# Patient Record
Sex: FEMALE | Race: WHITE
[De-identification: names, ages, dates, MRNs, and addresses within clinical notes are randomized per-mention and may not be internally consistent; named-entity substitution may affect disease eponyms.]

---

## 2019-11-27 ENCOUNTER — APPOINTMENT (OUTPATIENT)
Dept: PHYSICAL MEDICINE AND REHAB | Facility: CLINIC | Age: 54
End: 2019-11-27
Payer: COMMERCIAL

## 2019-11-27 VITALS
DIASTOLIC BLOOD PRESSURE: 67 MMHG | WEIGHT: 180 LBS | HEART RATE: 64 BPM | BODY MASS INDEX: 28.25 KG/M2 | HEIGHT: 67 IN | SYSTOLIC BLOOD PRESSURE: 101 MMHG

## 2019-11-27 DIAGNOSIS — Z78.9 OTHER SPECIFIED HEALTH STATUS: ICD-10-CM

## 2019-11-27 DIAGNOSIS — Z86.73 PERSONAL HISTORY OF TRANSIENT ISCHEMIC ATTACK (TIA), AND CEREBRAL INFARCTION W/OUT RESIDUAL DEFICITS: ICD-10-CM

## 2019-11-27 DIAGNOSIS — Z80.9 FAMILY HISTORY OF MALIGNANT NEOPLASM, UNSPECIFIED: ICD-10-CM

## 2019-11-27 PROBLEM — Z00.00 ENCOUNTER FOR PREVENTIVE HEALTH EXAMINATION: Status: ACTIVE | Noted: 2019-11-27

## 2019-11-27 PROCEDURE — 99203 OFFICE O/P NEW LOW 30 MIN: CPT

## 2019-11-27 RX ORDER — CIPROFLOXACIN HYDROCHLORIDE 250 MG/1
250 TABLET, FILM COATED ORAL
Qty: 6 | Refills: 0 | Status: DISCONTINUED | COMMUNITY
Start: 2019-09-15

## 2019-11-27 RX ORDER — NITROFURANTOIN (MONOHYDRATE/MACROCRYSTALS) 25; 75 MG/1; MG/1
100 CAPSULE ORAL
Qty: 14 | Refills: 0 | Status: DISCONTINUED | COMMUNITY
Start: 2019-10-15

## 2019-11-27 RX ORDER — PHENAZOPYRIDINE HYDROCHLORIDE 100 MG/1
100 TABLET ORAL
Qty: 6 | Refills: 0 | Status: DISCONTINUED | COMMUNITY
Start: 2019-09-15

## 2019-11-27 RX ORDER — CEFADROXIL 500 MG/1
500 CAPSULE ORAL
Qty: 14 | Refills: 0 | Status: DISCONTINUED | COMMUNITY
Start: 2019-07-17

## 2019-11-27 NOTE — DATA REVIEWED
[Plain X-Rays] : plain X-Rays [FreeTextEntry1] : X-rays R elbow reviewed in office: no fx; no significant joint space narrowing; small osteophyte off olecranon.

## 2019-11-27 NOTE — ASSESSMENT
[FreeTextEntry1] : 54 y.o. F w/ acute right elbow lateral epicondylitis +/- PIN neuralgia.  I cautioned pt. re: chronic use of PO NSAIDs 2' GI/cardiac/renal toxicities.  Further, concomitant use of non-selective NSAIDs may selectively inhibit ASA's effect on platelets.  I advised pt. to take ASA at least one hour prior to dose of ibuprofen.  Will Rx topical diclofenac gel to affect areas bid-tid prn.  Start P.T. for modalities, gentle ROM, stretching and progress to eccentric strengthening exercises.  May also trial counterforce bracing.  Discussed R/B/A to USG R LAT EPICONDYLE PERITENDINOUS CSI.  Pt. deferred injection for now.  RTC 4-6 weeks prn.

## 2019-11-27 NOTE — REVIEW OF SYSTEMS
[Negative] : Heme/Lymph [Patient Intake Form Reviewed] : Patient intake form was reviewed [Joint Pain] : joint pain [Muscle Pain] : muscle pain [FreeTextEntry9] : R elbow

## 2019-11-27 NOTE — PHYSICAL EXAM
[FreeTextEntry1] : NAD\par A&Ox3\par Non-obese\par Right elbow\par Inspection: swelling over lateral epicondyle humerus\par ROM: full F/E/P/S\par Palpation\par Med/Lat Epicondyle: VTTP Lat Epicondyle + 2 cm distal to LE over PIN\par Radial Hd: NTTP\par Olecranon Process: NTTP\par Radial Tunnel: TTP\par \par Provocative Tests\par Elbow Hyperflexion: neg\par Cozen's: +\par V/V stress: deferred\par Tinel's medial elbow: neg\par \par MMT: 4+/5 R WrExt 2' pain\par Sensation: no decr to PP along b/l SRS nerve distributions\par \par

## 2019-11-27 NOTE — HISTORY OF PRESENT ILLNESS
[FreeTextEntry1] : 54 y.o. ANJELICA MCKAY presents to office w/ c/o right elbow pain x 3-4 weeks.  Pt. believes her pain is related to exercises at gym.  Now, pain shoots down right forearm sometimes to fingers (in between 1st & 2nd and 2nd & 3rd digits).   Intermittent tingling in same fingers.  Pt. also describes swelling along superolateral aspect right elbow.  Describes has some "muscle tension" behind right shoulder.  \par \par Pt. went to local  center 11/20/19.  X-rays done and was Rx'd SMR.  Pt. has been taking ibuprofen 600 mg bid "on and off" since her pain started.  No P.T. or injections.  No sxs in left arm/hand.  Of note, pt. c/o chronic pain in both feet and sometimes knees and shoulders.

## 2019-12-03 ENCOUNTER — TRANSCRIPTION ENCOUNTER (OUTPATIENT)
Age: 54
End: 2019-12-03

## 2019-12-13 ENCOUNTER — TRANSCRIPTION ENCOUNTER (OUTPATIENT)
Age: 54
End: 2019-12-13

## 2019-12-17 ENCOUNTER — TRANSCRIPTION ENCOUNTER (OUTPATIENT)
Age: 54
End: 2019-12-17

## 2019-12-20 ENCOUNTER — APPOINTMENT (OUTPATIENT)
Dept: PULMONOLOGY | Facility: CLINIC | Age: 54
End: 2019-12-20
Payer: COMMERCIAL

## 2019-12-20 VITALS
SYSTOLIC BLOOD PRESSURE: 108 MMHG | OXYGEN SATURATION: 99 % | HEIGHT: 66 IN | BODY MASS INDEX: 29.73 KG/M2 | DIASTOLIC BLOOD PRESSURE: 62 MMHG | HEART RATE: 72 BPM | WEIGHT: 185 LBS

## 2019-12-20 DIAGNOSIS — Z80.0 FAMILY HISTORY OF MALIGNANT NEOPLASM OF DIGESTIVE ORGANS: ICD-10-CM

## 2019-12-20 DIAGNOSIS — E66.3 OVERWEIGHT: ICD-10-CM

## 2019-12-20 DIAGNOSIS — Z87.891 PERSONAL HISTORY OF NICOTINE DEPENDENCE: ICD-10-CM

## 2019-12-20 DIAGNOSIS — Z80.1 FAMILY HISTORY OF MALIGNANT NEOPLASM OF TRACHEA, BRONCHUS AND LUNG: ICD-10-CM

## 2019-12-20 DIAGNOSIS — J45.909 UNSPECIFIED ASTHMA, UNCOMPLICATED: ICD-10-CM

## 2019-12-20 PROCEDURE — 94010 BREATHING CAPACITY TEST: CPT

## 2019-12-20 PROCEDURE — 85018 HEMOGLOBIN: CPT | Mod: QW

## 2019-12-20 PROCEDURE — 99204 OFFICE O/P NEW MOD 45 MIN: CPT | Mod: 25

## 2019-12-20 PROCEDURE — 94727 GAS DIL/WSHOT DETER LNG VOL: CPT

## 2019-12-20 PROCEDURE — 94729 DIFFUSING CAPACITY: CPT

## 2019-12-20 RX ORDER — CETIRIZINE HCL 10 MG
10 TABLET ORAL
Refills: 0 | Status: ACTIVE | COMMUNITY

## 2019-12-20 RX ORDER — FLUCONAZOLE 150 MG/1
150 TABLET ORAL
Qty: 1 | Refills: 0 | Status: DISCONTINUED | COMMUNITY
Start: 2019-10-15 | End: 2019-12-20

## 2019-12-20 RX ORDER — ALBUTEROL SULFATE 2.5 MG/3ML
(2.5 MG/3ML) SOLUTION RESPIRATORY (INHALATION)
Refills: 0 | Status: ACTIVE | COMMUNITY

## 2019-12-20 RX ORDER — METAXALONE 800 MG/1
800 TABLET ORAL
Qty: 30 | Refills: 0 | Status: DISCONTINUED | COMMUNITY
Start: 2019-11-20 | End: 2019-12-20

## 2019-12-20 RX ORDER — ALBUTEROL SULFATE 90 UG/1
108 (90 BASE) AEROSOL, METERED RESPIRATORY (INHALATION)
Refills: 0 | Status: ACTIVE | COMMUNITY

## 2019-12-20 RX ORDER — ALPRAZOLAM 0.25 MG/1
0.25 TABLET ORAL
Qty: 90 | Refills: 0 | Status: DISCONTINUED | COMMUNITY
Start: 2019-09-15 | End: 2019-12-20

## 2019-12-20 RX ORDER — FLUTICASONE FUROATE 27.5 UG/1
27.5 SPRAY, METERED NASAL
Refills: 0 | Status: ACTIVE | COMMUNITY

## 2019-12-20 NOTE — PHYSICAL EXAM
[General Appearance - Well Developed] : well developed [Normal Appearance] : normal appearance [Normal Conjunctiva] : the conjunctiva exhibited no abnormalities [General Appearance - In No Acute Distress] : no acute distress [Normal Oropharynx] : normal oropharynx [Neck Appearance] : the appearance of the neck was normal [Heart Rate And Rhythm] : heart rate and rhythm were normal [Heart Sounds] : normal S1 and S2 [Murmurs] : no murmurs present [Edema] : no peripheral edema present [] : no respiratory distress [Respiration, Rhythm And Depth] : normal respiratory rhythm and effort [Exaggerated Use Of Accessory Muscles For Inspiration] : no accessory muscle use [Auscultation Breath Sounds / Voice Sounds] : lungs were clear to auscultation bilaterally [Abdomen Soft] : soft [Abnormal Walk] : normal gait [Abdomen Tenderness] : non-tender [Nail Clubbing] : no clubbing of the fingernails [Cyanosis, Localized] : no localized cyanosis [No Focal Deficits] : no focal deficits [Oriented To Time, Place, And Person] : oriented to person, place, and time [FreeTextEntry1] : No abnormalities.

## 2019-12-20 NOTE — REASON FOR VISIT
[Initial Evaluation] : an initial evaluation [Asthma] : asthma [Shortness of Breath] : shortness of Breath [Cough] : cough [Wheezing] : wheezing [FreeTextEntry2] : weight issues

## 2019-12-20 NOTE — HISTORY OF PRESENT ILLNESS
[FreeTextEntry1] : The patient reports multiple visits to Urgent Care over the past several weeks with cough, SOB, and wheeze. CXR from 12/17/19 was negative. She has completed a course of Zithromax and prednisone without improvement so is now on doxycycline and higher prednisone with only mild improvement. She complains of residual congested cough and SOB with occasional wheeze and now with sinus congestion as well. She is followed by allergy for chronic allergy shots. She reports a h/o childhood asthma but is not maintained on chronic BD therapy though does have a nebulizer at home. \par She is an ex-smoker of 1 ppd x 20 years but quit in 2006.

## 2019-12-20 NOTE — CONSULT LETTER
[Dear  ___] : Dear  [unfilled], [Please see my note below.] : Please see my note below. [Consult Letter:] : I had the pleasure of evaluating your patient, [unfilled]. [Consult Closing:] : Thank you very much for allowing me to participate in the care of this patient.  If you have any questions, please do not hesitate to contact me. [FreeTextEntry3] : Jd Elizabeth MD, FCCP, D. ABSM\par Pulmonary and Sleep Medicine\par [facility] [place] [Sincerely,] : Sincerely,

## 2019-12-20 NOTE — REVIEW OF SYSTEMS
[Nasal Congestion] : nasal congestion [Postnasal Drip] : postnasal drip [Sinus Problems] : sinus problems [Cough] : cough [Sputum] : sputum  [Dyspnea] : dyspnea [Chest Tightness] : chest tightness [Wheezing] : wheezing [Fever] : no fever [Chills] : no chills [Dry Eyes] : no dryness of the eyes [Eye Irritation] : no ~T irritation of the eyes [Pleuritic Pain] : no pleuritic pain [Epistaxis] : no nosebleeds [Hypertension] : no ~T hypertension [Hypotension] : no hypotension [Dysrhythmia] : no dysrhythmia [Murmurs] : no murmurs were heard [Chest Discomfort] : no chest discomfort [Palpitations] : no palpitations [Edema] : ~T edema was not present [Itchy Eyes] : no itching of ~T the eyes [Hay Fever] : no hay fever [Nausea] : no nausea [Reflux] : no reflux [Constipation] : no constipation [Vomiting] : no vomiting [Abdominal Pain] : no abdominal pain [Diarrhea] : no diarrhea [Trauma] : no ~T physical trauma [Dysuria] : no dysuria [Anemia] : no anemia [Fracture] : no fracture [Dizziness] : no dizziness [Headache] : no headache [Syncope] : no fainting [Numbness] : no numbness [Paralysis] : no paralysis was seen [Seizures] : no seizures [Depression] : no depression [Anxiety] : no anxiety [Diabetes] : no diabetes mellitus [Snoring] : no snoring [Thyroid Problem] : no thyroid problem [Witnessed Apneas] : demonstrated no ~M apnea [Nonrestorative Sleep] : restorative sleep

## 2019-12-20 NOTE — DISCUSSION/SUMMARY
[FreeTextEntry1] : \par #1. PFTs performed today are essentially normal.\par #2. The patient does not appear to require chronic BD therapy at this time\par #3. SOBOE is likely related to weight or deconditioning given normal PFTs\par #4. Diet and exercise for weight loss\par #5. Short course of Breo 200 for symptoms\par #6. Complete prednisone and abx\par #7. Mucomyst for cough\par #8. Mucinex and Tessalon as needed\par #9. F/u in 4-6 weeks

## 2020-01-10 ENCOUNTER — RX RENEWAL (OUTPATIENT)
Age: 55
End: 2020-01-10

## 2020-02-12 ENCOUNTER — APPOINTMENT (OUTPATIENT)
Dept: PHYSICAL MEDICINE AND REHAB | Facility: CLINIC | Age: 55
End: 2020-02-12
Payer: COMMERCIAL

## 2020-02-12 VITALS
SYSTOLIC BLOOD PRESSURE: 106 MMHG | DIASTOLIC BLOOD PRESSURE: 73 MMHG | BODY MASS INDEX: 29.73 KG/M2 | HEIGHT: 66 IN | WEIGHT: 185 LBS

## 2020-02-12 DIAGNOSIS — M77.11 LATERAL EPICONDYLITIS, RIGHT ELBOW: ICD-10-CM

## 2020-02-12 DIAGNOSIS — G56.31 LESION OF RADIAL NERVE, RIGHT UPPER LIMB: ICD-10-CM

## 2020-02-12 PROCEDURE — 99213 OFFICE O/P EST LOW 20 MIN: CPT | Mod: 25

## 2020-02-12 PROCEDURE — 76942 ECHO GUIDE FOR BIOPSY: CPT | Mod: RT

## 2020-02-12 PROCEDURE — 20550 NJX 1 TENDON SHEATH/LIGAMENT: CPT | Mod: RT

## 2020-02-12 NOTE — PHYSICAL EXAM
[FreeTextEntry1] : NAD\par A&Ox3\par Non-obese\par C-spine\par DTR's: 2+ symmetric B/T/Br\par MMT: 5/5 B/L D/SS\par Sensation: no decrement to PP testing C6-T1 dermatomes\par Right elbow\par Inspection: swelling over lateral epicondyle humerus (stable)\par ROM: full F/E/P/S\par Palpation\par Med/Lat Epicondyle: VTTP Lat Epicondyle + 2 cm distal to LE over PIN\par Radial Hd: NTTP\par Olecranon Process: NTTP\par Radial Tunnel: TTP\par \par Provocative Tests\par Elbow Hyperflexion: neg\par Cozen's: +\par V/V stress: deferred\par Tinel's medial elbow: neg\par \par MMT: 4+/5 R WrExt 2' pain\par Sensation: no decr to PP along b/l SRS nerve distributions\par \par

## 2020-02-12 NOTE — HISTORY OF PRESENT ILLNESS
[FreeTextEntry1] : 54 y.o. F w/ right elbow lateral epicondylitis +/- PIN neuralgia returns to office for f/u.  Pt. did not have time to do P.T.  Voltaren gel 1% not very effective.  Pt. is taking 600 mg po bid prn.  Of note, pt. reports that she felt better while taking prednisone 50 mg/day for respiratory infections x 2-3 weeks.  Pt. states that the counterforce brace irritates her sxs.  Pt. now c/o right sided neck pain radiating into right shoulder down the arm towards elbow and into dorsum wrist in between 2nd/3rd and 3rd/4th fingers.

## 2020-02-12 NOTE — PROCEDURE
[de-identified] : Reason for Procedure: RIGHT LATERAL ELBOW/FOREARM PAIN\par Physician: Dr. Alex Penaloza\par Medication: 20 MG KENALOG, 1.5 CC 1% LIDOCAINE\par Blood Loss: None\par Complications: None\par \par \par PROCEDURE NOTE: R/B/A to USG RIGHT ELBOW COMMON EXTENSOR PERITENDINOUS CSI explained to patient. Pt. is agreeable and wishes to proceed. Signed consent form to be scanned into EMR. The patient was positioned SUPINE with the RIGHT ELBOW FLEXED to about 90' and FOREARM PRONATED.  The transducer was placed in long axis to the common extensor tendon origin over the lateral epicondyle of the humerus. Using an in-plane approach, a 25-G, 1.5" needle was passed in close proximity to the CET.  This was followed by injection of a therapeutic solution consisting of 1.5 cc 1% lidocaine + 20 mg Kenalog. The patient tolerated the procedure well without adverse effects. A band aid and ice pack was applied. Post injection instructions given.\par

## 2020-02-12 NOTE — ASSESSMENT
[FreeTextEntry1] : 54 y.o. F w/ acute right elbow lateral epicondylitis +/- PIN neuralgia.  USG R common wrist extensor peritendinous CSI administered at today's office.  I again cautioned pt. re: chronic use of PO NSAIDs 2' GI/cardiac/renal toxicities. Further, concomitant use of non-selective NSAIDs may selectively inhibit ASA's effect on platelets.  I advised pt. to take ASA at least one hour prior to dose of ibuprofen.   I reviewed proper HEP w/ patient.  RTC 4-6 weeks prn.

## 2020-02-24 ENCOUNTER — APPOINTMENT (OUTPATIENT)
Dept: PULMONOLOGY | Facility: CLINIC | Age: 55
End: 2020-02-24

## 2020-02-24 RX ORDER — PREDNISONE 10 MG/1
10 TABLET ORAL DAILY
Qty: 100 | Refills: 0 | Status: DISCONTINUED | COMMUNITY
Start: 2019-12-20 | End: 2020-02-24

## 2020-02-24 RX ORDER — BENZONATATE 100 MG/1
100 CAPSULE ORAL
Refills: 0 | Status: DISCONTINUED | COMMUNITY
End: 2020-02-24

## 2020-02-24 RX ORDER — DOXYCYCLINE 100 MG/1
100 CAPSULE ORAL
Refills: 0 | Status: DISCONTINUED | COMMUNITY
End: 2020-02-24

## 2022-06-13 ENCOUNTER — NON-APPOINTMENT (OUTPATIENT)
Age: 57
End: 2022-06-13

## 2022-12-09 ENCOUNTER — APPOINTMENT (OUTPATIENT)
Dept: PULMONOLOGY | Facility: CLINIC | Age: 57
End: 2022-12-09

## 2022-12-09 VITALS
HEIGHT: 66 IN | HEART RATE: 73 BPM | BODY MASS INDEX: 30.53 KG/M2 | WEIGHT: 190 LBS | DIASTOLIC BLOOD PRESSURE: 78 MMHG | OXYGEN SATURATION: 98 % | RESPIRATION RATE: 16 BRPM | SYSTOLIC BLOOD PRESSURE: 90 MMHG

## 2022-12-09 DIAGNOSIS — R06.02 SHORTNESS OF BREATH: ICD-10-CM

## 2022-12-09 DIAGNOSIS — J45.909 UNSPECIFIED ASTHMA, UNCOMPLICATED: ICD-10-CM

## 2022-12-09 PROCEDURE — 99204 OFFICE O/P NEW MOD 45 MIN: CPT

## 2022-12-09 RX ORDER — FLUTICASONE PROPION/SALMETEROL 500-50 MCG
BLISTER, WITH INHALATION DEVICE INHALATION
Refills: 0 | Status: DISCONTINUED | COMMUNITY
End: 2022-12-09

## 2022-12-09 RX ORDER — ALBUTEROL SULFATE 2.5 MG/3ML
(2.5 MG/3ML) SOLUTION RESPIRATORY (INHALATION)
Qty: 1 | Refills: 3 | Status: ACTIVE | COMMUNITY
Start: 2022-12-09 | End: 1900-01-01

## 2022-12-09 RX ORDER — DICLOFENAC SODIUM 10 MG/G
1 GEL TOPICAL
Qty: 100 | Refills: 1 | Status: DISCONTINUED | COMMUNITY
Start: 2019-11-27 | End: 2022-12-09

## 2022-12-09 NOTE — PHYSICAL EXAM
[No Acute Distress] : no acute distress [Normal Appearance] : normal appearance [Normal Rate/Rhythm] : normal rate/rhythm [Normal S1, S2] : normal s1, s2 [No Murmurs] : no murmurs [No Resp Distress] : no resp distress [Clear to Auscultation Bilaterally] : clear to auscultation bilaterally [Benign] : benign [Normal Gait] : normal gait [No Clubbing] : no clubbing [No Cyanosis] : no cyanosis [No Edema] : no edema [FROM] : FROM [No Focal Deficits] : no focal deficits [Oriented x3] : oriented x3 [Normal Mood] : normal mood [Normal Affect] : normal affect [Remote Memory Normal] : remote memory normal

## 2022-12-09 NOTE — HISTORY OF PRESENT ILLNESS
[TextBox_4] : 1 Month ago\par was exposed to RSV\par given, zpack and steroids\par then went to urgent care [place] 6 days\par CXR - no pna\par given Doxycline and prednisone\par no fever, chills, chest pain\par getting night sweats\par works at [employer]\par hx asthma bronchitis- yearly - lasts week or two\par some post nasal congestion\par mild snoring\par no pets\par no cardiac hx\par sees Dr Givens for allergy injections, "not for asthma"\par

## 2022-12-09 NOTE — DISCUSSION/SUMMARY
[FreeTextEntry1] : Asthma by hx, last PFTs 2019 normal\par Recent RSV exposure, not tested, but Covid negative\par persistent Cough/rhinitis since, minimal  improvement by hx\par Currently with frequent throat clearing cough, no bronchospasm on exam, normal sp02\par Finishing up prednisone and abx\par Will obtain CXR\par stat Breo 100 daily and technique reviewed\par prednisone taper re written\par prn neb\par Upper airway mgmt with oral anti histamine and Flonase\par spirometry\par 1 month or sooner if needed, ER if any change in status

## 2022-12-09 NOTE — CONSULT LETTER
[Dear  ___] : Dear  [unfilled], [Consult Letter:] : I had the pleasure of evaluating your patient, [unfilled]. [Please see my note below.] : Please see my note below. [Sincerely,] : Sincerely, [FreeTextEntry3] : Param Jean DO [facility]\par Pulmonary Critical Care\par Director Pulmonary Division\par Medical Director Respiratory Therapy\par [facility]\par \par

## 2022-12-14 RX ORDER — FLUTICASONE FUROATE AND VILANTEROL TRIFENATATE 100; 25 UG/1; UG/1
100-25 POWDER RESPIRATORY (INHALATION)
Qty: 3 | Refills: 0 | Status: ACTIVE | COMMUNITY
Start: 2022-12-14 | End: 1900-01-01

## 2022-12-21 ENCOUNTER — APPOINTMENT (OUTPATIENT)
Dept: PULMONOLOGY | Facility: CLINIC | Age: 57
End: 2022-12-21

## 2022-12-21 VITALS
HEART RATE: 72 BPM | OXYGEN SATURATION: 99 % | RESPIRATION RATE: 16 BRPM | DIASTOLIC BLOOD PRESSURE: 64 MMHG | SYSTOLIC BLOOD PRESSURE: 100 MMHG

## 2022-12-21 VITALS — WEIGHT: 190 LBS | BODY MASS INDEX: 30.17 KG/M2 | HEIGHT: 66.5 IN

## 2022-12-21 DIAGNOSIS — R05.9 COUGH, UNSPECIFIED: ICD-10-CM

## 2022-12-21 PROCEDURE — 99213 OFFICE O/P EST LOW 20 MIN: CPT | Mod: 25

## 2022-12-21 PROCEDURE — 94010 BREATHING CAPACITY TEST: CPT

## 2022-12-21 RX ORDER — FLUTICASONE FUROATE AND VILANTEROL TRIFENATATE 100; 25 UG/1; UG/1
100-25 POWDER RESPIRATORY (INHALATION) DAILY
Qty: 1 | Refills: 5 | Status: ACTIVE | COMMUNITY
Start: 2022-12-21 | End: 1900-01-01

## 2022-12-21 RX ORDER — PREDNISONE 10 MG/1
10 TABLET ORAL
Qty: 30 | Refills: 3 | Status: DISCONTINUED | COMMUNITY
Start: 2022-12-09 | End: 2022-12-21

## 2022-12-21 RX ORDER — GUAIFENESIN AND PSEUDOEPHEDRINE HYDROCHLORIDE 1200; 120 MG/1; MG/1
TABLET, EXTENDED RELEASE ORAL
Refills: 0 | Status: DISCONTINUED | COMMUNITY
End: 2022-12-21

## 2022-12-21 RX ORDER — ACETYLCYSTEINE 100 MG/ML
10 SOLUTION ORAL; RESPIRATORY (INHALATION) EVERY 8 HOURS
Qty: 15 | Refills: 0 | Status: DISCONTINUED | COMMUNITY
Start: 2019-12-20 | End: 2022-12-21

## 2022-12-21 RX ORDER — BENZONATATE 100 MG/1
100 CAPSULE ORAL EVERY 6 HOURS
Qty: 30 | Refills: 2 | Status: DISCONTINUED | COMMUNITY
Start: 2022-12-09 | End: 2022-12-21

## 2022-12-21 NOTE — CONSULT LETTER
[Dear  ___] : Dear  [unfilled], [Consult Letter:] : I had the pleasure of evaluating your patient, [unfilled]. [Please see my note below.] : Please see my note below. [Sincerely,] : Sincerely, [FreeTextEntry3] : Param Jean DO [facility]\par Pulmonary Critical Care\par Director Pulmonary Division\par Medical Director Respiratory Therapy\par [facility]\par \par

## 2022-12-21 NOTE — HISTORY OF PRESENT ILLNESS
[TextBox_4] : 1 Month ago\par was exposed to RSV\par given, zpack and steroids\par then went to urgent care [place] 6 days\par CXR - no pna\par given Doxycline and prednisone\par no fever, chills, chest pain\par getting night sweats\par works at [employer]\par hx asthma bronchitis- yearly - lasts week or two\par some post nasal congestion\par mild snoring\par no pets\par no cardiac hx\par sees Dr Givens for allergy injections, "not for asthma"\par \par 12/21/22\par Feels at baseline\par using Breo daily\par finishing pred taper\par

## 2023-04-13 ENCOUNTER — APPOINTMENT (OUTPATIENT)
Dept: PULMONOLOGY | Facility: CLINIC | Age: 58
End: 2023-04-13

## 2024-02-07 ENCOUNTER — APPOINTMENT (OUTPATIENT)
Dept: ORTHOPEDIC SURGERY | Facility: CLINIC | Age: 59
End: 2024-02-07

## 2024-11-04 ENCOUNTER — NON-APPOINTMENT (OUTPATIENT)
Age: 59
End: 2024-11-04

## 2024-11-08 ENCOUNTER — NON-APPOINTMENT (OUTPATIENT)
Age: 59
End: 2024-11-08

## 2025-02-05 ENCOUNTER — APPOINTMENT (OUTPATIENT)
Dept: PULMONOLOGY | Facility: CLINIC | Age: 60
End: 2025-02-05
Payer: COMMERCIAL

## 2025-02-05 VITALS
HEIGHT: 66 IN | BODY MASS INDEX: 31.82 KG/M2 | DIASTOLIC BLOOD PRESSURE: 78 MMHG | RESPIRATION RATE: 16 BRPM | HEART RATE: 78 BPM | WEIGHT: 198 LBS | SYSTOLIC BLOOD PRESSURE: 132 MMHG | OXYGEN SATURATION: 98 %

## 2025-02-05 DIAGNOSIS — J45.909 UNSPECIFIED ASTHMA, UNCOMPLICATED: ICD-10-CM

## 2025-02-05 DIAGNOSIS — R05.9 COUGH, UNSPECIFIED: ICD-10-CM

## 2025-02-05 DIAGNOSIS — R05.3 CHRONIC COUGH: ICD-10-CM

## 2025-02-05 PROCEDURE — 94010 BREATHING CAPACITY TEST: CPT

## 2025-02-05 PROCEDURE — 99215 OFFICE O/P EST HI 40 MIN: CPT | Mod: 25

## 2025-02-05 RX ORDER — FLUTICASONE PROPIONATE AND SALMETEROL 100; 50 UG/1; UG/1
100-50 POWDER RESPIRATORY (INHALATION)
Qty: 1 | Refills: 5 | Status: ACTIVE | COMMUNITY
Start: 2025-02-05 | End: 1900-01-01

## 2025-02-05 RX ORDER — PREDNISONE 10 MG/1
10 TABLET ORAL
Qty: 30 | Refills: 4 | Status: ACTIVE | COMMUNITY
Start: 2025-02-05 | End: 1900-01-01

## 2025-02-05 RX ORDER — ROSUVASTATIN CALCIUM 5 MG/1
TABLET, FILM COATED ORAL
Refills: 0 | Status: ACTIVE | COMMUNITY

## 2025-02-05 RX ORDER — DOXYCYCLINE HYCLATE 100 MG/1
100 TABLET ORAL
Qty: 10 | Refills: 3 | Status: ACTIVE | COMMUNITY
Start: 2025-02-05 | End: 1900-01-01

## 2025-02-05 RX ORDER — FLUOXETINE HYDROCHLORIDE 20 MG/1
20 CAPSULE ORAL
Refills: 0 | Status: ACTIVE | COMMUNITY

## 2025-02-05 RX ORDER — FLUTICASONE PROPIONATE AND SALMETEROL 50; 500 UG/1; UG/1
POWDER RESPIRATORY (INHALATION)
Refills: 0 | Status: ACTIVE | COMMUNITY

## 2025-02-13 ENCOUNTER — NON-APPOINTMENT (OUTPATIENT)
Age: 60
End: 2025-02-13

## 2025-03-01 ENCOUNTER — APPOINTMENT (OUTPATIENT)
Dept: CT IMAGING | Facility: CLINIC | Age: 60
End: 2025-03-01

## 2025-03-01 ENCOUNTER — OUTPATIENT (OUTPATIENT)
Dept: OUTPATIENT SERVICES | Facility: HOSPITAL | Age: 60
LOS: 1 days | End: 2025-03-01
Payer: COMMERCIAL

## 2025-03-01 DIAGNOSIS — R05.3 CHRONIC COUGH: ICD-10-CM

## 2025-03-01 PROCEDURE — 71250 CT THORAX DX C-: CPT | Mod: 26

## 2025-03-11 ENCOUNTER — NON-APPOINTMENT (OUTPATIENT)
Age: 60
End: 2025-03-11

## 2025-03-11 DIAGNOSIS — E04.1 NONTOXIC SINGLE THYROID NODULE: ICD-10-CM

## 2025-04-15 ENCOUNTER — APPOINTMENT (OUTPATIENT)
Dept: ULTRASOUND IMAGING | Facility: CLINIC | Age: 60
End: 2025-04-15
Payer: COMMERCIAL

## 2025-04-15 ENCOUNTER — OUTPATIENT (OUTPATIENT)
Dept: OUTPATIENT SERVICES | Facility: HOSPITAL | Age: 60
LOS: 1 days | End: 2025-04-15
Payer: COMMERCIAL

## 2025-04-15 DIAGNOSIS — E04.1 NONTOXIC SINGLE THYROID NODULE: ICD-10-CM

## 2025-04-15 PROCEDURE — 76536 US EXAM OF HEAD AND NECK: CPT | Mod: 26

## 2025-04-15 PROCEDURE — 76536 US EXAM OF HEAD AND NECK: CPT

## 2025-04-24 ENCOUNTER — NON-APPOINTMENT (OUTPATIENT)
Age: 60
End: 2025-04-24

## 2025-05-07 ENCOUNTER — NON-APPOINTMENT (OUTPATIENT)
Age: 60
End: 2025-05-07

## 2025-05-07 ENCOUNTER — APPOINTMENT (OUTPATIENT)
Dept: OTOLARYNGOLOGY | Facility: CLINIC | Age: 60
End: 2025-05-07
Payer: COMMERCIAL

## 2025-05-07 VITALS
BODY MASS INDEX: 31.39 KG/M2 | DIASTOLIC BLOOD PRESSURE: 66 MMHG | WEIGHT: 200 LBS | HEART RATE: 75 BPM | SYSTOLIC BLOOD PRESSURE: 105 MMHG | HEIGHT: 67 IN

## 2025-05-07 DIAGNOSIS — E78.5 HYPERLIPIDEMIA, UNSPECIFIED: ICD-10-CM

## 2025-05-07 DIAGNOSIS — E04.1 NONTOXIC SINGLE THYROID NODULE: ICD-10-CM

## 2025-05-07 PROCEDURE — 99204 OFFICE O/P NEW MOD 45 MIN: CPT

## 2025-05-19 ENCOUNTER — RESULT REVIEW (OUTPATIENT)
Age: 60
End: 2025-05-19

## 2025-05-28 ENCOUNTER — OUTPATIENT (OUTPATIENT)
Dept: OUTPATIENT SERVICES | Facility: HOSPITAL | Age: 60
LOS: 1 days | End: 2025-05-28
Payer: COMMERCIAL

## 2025-05-28 ENCOUNTER — RESULT REVIEW (OUTPATIENT)
Age: 60
End: 2025-05-28

## 2025-05-28 ENCOUNTER — APPOINTMENT (OUTPATIENT)
Dept: ULTRASOUND IMAGING | Facility: IMAGING CENTER | Age: 60
End: 2025-05-28
Payer: COMMERCIAL

## 2025-05-28 DIAGNOSIS — E04.1 NONTOXIC SINGLE THYROID NODULE: ICD-10-CM

## 2025-05-28 DIAGNOSIS — Z00.8 ENCOUNTER FOR OTHER GENERAL EXAMINATION: ICD-10-CM

## 2025-05-28 PROCEDURE — 88172 CYTP DX EVAL FNA 1ST EA SITE: CPT

## 2025-05-28 PROCEDURE — 88173 CYTOPATH EVAL FNA REPORT: CPT

## 2025-05-28 PROCEDURE — 10005 FNA BX W/US GDN 1ST LES: CPT

## 2025-05-28 PROCEDURE — 88173 CYTOPATH EVAL FNA REPORT: CPT | Mod: 26

## 2025-05-29 LAB — NON-GYNECOLOGICAL CYTOLOGY STUDY: SIGNIFICANT CHANGE UP

## 2025-06-03 ENCOUNTER — NON-APPOINTMENT (OUTPATIENT)
Age: 60
End: 2025-06-03

## 2025-06-03 DIAGNOSIS — E04.1 NONTOXIC SINGLE THYROID NODULE: ICD-10-CM
